# Patient Record
Sex: FEMALE | Race: WHITE | NOT HISPANIC OR LATINO
[De-identification: names, ages, dates, MRNs, and addresses within clinical notes are randomized per-mention and may not be internally consistent; named-entity substitution may affect disease eponyms.]

---

## 2018-07-08 ENCOUNTER — TRANSCRIPTION ENCOUNTER (OUTPATIENT)
Age: 38
End: 2018-07-08

## 2018-10-29 ENCOUNTER — TRANSCRIPTION ENCOUNTER (OUTPATIENT)
Age: 38
End: 2018-10-29

## 2022-01-05 ENCOUNTER — TRANSCRIPTION ENCOUNTER (OUTPATIENT)
Age: 42
End: 2022-01-05

## 2023-01-19 PROBLEM — Z00.00 ENCOUNTER FOR PREVENTIVE HEALTH EXAMINATION: Status: ACTIVE | Noted: 2023-01-19

## 2023-01-24 ENCOUNTER — NON-APPOINTMENT (OUTPATIENT)
Age: 43
End: 2023-01-24

## 2023-01-24 ENCOUNTER — APPOINTMENT (OUTPATIENT)
Dept: CARDIOLOGY | Facility: CLINIC | Age: 43
End: 2023-01-24
Payer: MEDICAID

## 2023-01-24 ENCOUNTER — TRANSCRIPTION ENCOUNTER (OUTPATIENT)
Age: 43
End: 2023-01-24

## 2023-01-24 VITALS — DIASTOLIC BLOOD PRESSURE: 84 MMHG | SYSTOLIC BLOOD PRESSURE: 132 MMHG

## 2023-01-24 VITALS
DIASTOLIC BLOOD PRESSURE: 82 MMHG | HEART RATE: 76 BPM | WEIGHT: 293 LBS | HEIGHT: 62 IN | SYSTOLIC BLOOD PRESSURE: 130 MMHG | BODY MASS INDEX: 53.92 KG/M2 | OXYGEN SATURATION: 97 %

## 2023-01-24 DIAGNOSIS — Z82.49 FAMILY HISTORY OF ISCHEMIC HEART DISEASE AND OTHER DISEASES OF THE CIRCULATORY SYSTEM: ICD-10-CM

## 2023-01-24 DIAGNOSIS — F32.A DEPRESSION, UNSPECIFIED: ICD-10-CM

## 2023-01-24 PROCEDURE — 99203 OFFICE O/P NEW LOW 30 MIN: CPT | Mod: 25

## 2023-01-24 PROCEDURE — 93000 ELECTROCARDIOGRAM COMPLETE: CPT

## 2023-01-24 RX ORDER — LORATADINE 5 MG/5 ML
SOLUTION, ORAL ORAL
Refills: 0 | Status: ACTIVE | COMMUNITY

## 2023-01-24 RX ORDER — ALBUTEROL SULFATE 90 UG/1
108 (90 BASE) INHALANT RESPIRATORY (INHALATION) AS DIRECTED
Refills: 0 | Status: ACTIVE | COMMUNITY

## 2023-01-24 RX ORDER — ALBUTEROL SULFATE 90 UG/1
108 (90 BASE) AEROSOL, METERED RESPIRATORY (INHALATION) AS DIRECTED
Refills: 0 | Status: ACTIVE | COMMUNITY

## 2023-01-24 RX ORDER — MONTELUKAST SODIUM 10 MG/1
10 TABLET, FILM COATED ORAL DAILY
Refills: 0 | Status: ACTIVE | COMMUNITY

## 2023-01-24 RX ORDER — CITALOPRAM HYDROBROMIDE 10 MG/1
10 TABLET, FILM COATED ORAL DAILY
Refills: 0 | Status: ACTIVE | COMMUNITY

## 2023-01-24 RX ORDER — PANTOPRAZOLE SODIUM 40 MG/1
40 TABLET, DELAYED RELEASE ORAL DAILY
Qty: 90 | Refills: 1 | Status: ACTIVE | COMMUNITY

## 2023-01-24 RX ORDER — LEVOTHYROXINE SODIUM 0.03 MG/1
25 TABLET ORAL DAILY
Refills: 0 | Status: ACTIVE | COMMUNITY

## 2023-01-24 NOTE — DISCUSSION/SUMMARY
[FreeTextEntry1] : Sherley is a 42-year-old female with medical history detailed above and active medical issues including:\par \par - Dyspnea, sedentary lifestyle, multiple CAD risk factors.  Coronary CTA and coronary calcium score ordered to access for obstructive CAD and risk stratification.  Echocardiogram ordered to evaluate for structural heart disease, carotid ultrasound to evaluate for PAD.\par \par - Near-syncope standing from sitting position, likely vasovagal.  Recommended increased oral hydration with electrolyte suppliment drinks, avoid caffeine and alcohol intake.\par \par - Morbid obesity.  Discussed calorie reduction and increased exercise as a weight reduction strategy.\par \par Advised patient to follow active lifestyle with regular cardiovascular exercise. Patient educated on heart healthy diet. Recommend increased oral hydration with electrolyte suppliment drinks, avoid excess alcohol and caffeine.  Patient is aware to call with any symptoms or concerns. \par \par Cardiology follow-up after noninvasive testing. \par \par Sherley will follow up with Dr Ml Guevara for primary care

## 2023-01-24 NOTE — HISTORY OF PRESENT ILLNESS
[FreeTextEntry1] : Sherley is a 42-year-old female with history of asthma, morbid obesity, anxiety depression after father  from CAD.\par \par No history of CAD, MI, revascularization, VHD, CHF, TIA, CVA, diabetes, PVD, DVT, PE, arrhythmia, AF.\par \par Patient has palpitations and dizziness standing from a sitting position and when standing for long periods.  Patient has dyspnea with moderate exertion.  Cardiovascular review of symptoms is negative for exertional chest pain or syncope.  No PND or orthopnea leg edema.  No bleeding or black stool.\par \par No exercise routine.  Patient is walking less than 5 minutes.  Patient is working at [employer] in [place].  Patient's father used CAD was a long-term patient of Dr Valentino. Patient remains emotional discussing her father. \par \par EKG 2023 sinus rhythm normal tracing\par \par

## 2023-02-09 ENCOUNTER — APPOINTMENT (OUTPATIENT)
Dept: CARDIOLOGY | Facility: CLINIC | Age: 43
End: 2023-02-09
Payer: MEDICAID

## 2023-02-09 PROCEDURE — 93244 EXT ECG>48HR<7D REV&INTERPJ: CPT

## 2023-02-16 ENCOUNTER — APPOINTMENT (OUTPATIENT)
Dept: CARDIOLOGY | Facility: CLINIC | Age: 43
End: 2023-02-16
Payer: MEDICAID

## 2023-02-16 VITALS
WEIGHT: 293 LBS | OXYGEN SATURATION: 96 % | BODY MASS INDEX: 53.92 KG/M2 | HEIGHT: 62 IN | SYSTOLIC BLOOD PRESSURE: 126 MMHG | HEART RATE: 76 BPM | DIASTOLIC BLOOD PRESSURE: 72 MMHG

## 2023-02-16 PROCEDURE — 99215 OFFICE O/P EST HI 40 MIN: CPT

## 2023-02-16 PROCEDURE — 93880 EXTRACRANIAL BILAT STUDY: CPT

## 2023-02-16 PROCEDURE — 93306 TTE W/DOPPLER COMPLETE: CPT

## 2023-02-16 NOTE — DISCUSSION/SUMMARY
[FreeTextEntry1] : Sherley is a 42-year-old female with medical history detailed above and active medical issues including:\par \par - Dyspnea, sedentary lifestyle, multiple CAD risk factors.  Coronary CTA and coronary calcium score ordered to access for obstructive CAD and risk stratification.  Normal LVEF normal Doppler echo Feb 2023\par \par - Near-syncope standing from sitting position, likely vasovagal.  Recommended increased oral hydration with electrolyte suppliment drinks, avoid caffeine and alcohol intake.  1 week Zio patch Feb 2023 sinus rhythm without significant arrhythmia\par \par - Morbid obesity.  Discussed calorie reduction and increased exercise as a weight reduction strategy.\par \par Advised patient to follow active lifestyle with regular cardiovascular exercise. Patient educated on heart healthy diet. Recommend increased oral hydration with electrolyte suppliment drinks, avoid excess alcohol and caffeine.  Patient is aware to call with any symptoms or concerns. \par \par Cardiology follow-up after telehealth after coronary CTA\par \par Sherley will follow up with Dr Ml Guevara for primary care\par

## 2023-02-16 NOTE — HISTORY OF PRESENT ILLNESS
[FreeTextEntry1] : Sherley is a 42-year-old female with history of asthma, morbid obesity, anxiety depression after father  from CAD.\par \par No history of CAD, MI, revascularization, VHD, CHF, TIA, CVA, diabetes, PVD, DVT, PE, arrhythmia, AF.\par \par Patient has palpitations and dizziness standing from a sitting position and when standing for long periods.  Patient has dyspnea with moderate exertion.  Cardiovascular review of symptoms is negative for exertional chest pain or syncope.  No PND or orthopnea leg edema.  No bleeding or black stool.\par \par No exercise routine.  Patient is walking less than 5 minutes.  Patient is working at [employer] in [place].  Patient's father with CAD / MI / ICD was a long-term patient of Dr Valentino. Patient remains emotional discussing her father. \par \par Echocardiogram 2023 LVEF 55 to 60%, normal Doppler, suboptimal study\par \par Zio patch 1 week heart monitor 2023 sinus rhythm average heart rate 86, rare PACs PVCs\par \par EKG 2023 sinus rhythm normal tracing\par \par

## 2023-03-07 ENCOUNTER — NON-APPOINTMENT (OUTPATIENT)
Age: 43
End: 2023-03-07

## 2023-03-22 PROBLEM — F41.9 ANXIETY: Status: ACTIVE | Noted: 2023-01-24

## 2023-03-22 NOTE — HISTORY OF PRESENT ILLNESS
[FreeTextEntry1] : Sherley is a 42-year-old female with history of asthma, morbid obesity, anxiety depression after father  from CAD.\par \par No history of CAD, MI, revascularization, VHD, CHF, TIA, CVA, diabetes, PVD, DVT, PE, arrhythmia, AF.\par \par Patient has palpitations and dizziness standing from a sitting position and when standing for long periods.  Patient has dyspnea with moderate exertion.  Cardiovascular review of symptoms is negative for exertional chest pain or syncope.  No PND or orthopnea leg edema.  No bleeding or black stool.\par \par No exercise routine.  Patient is walking less than 5 minutes.  Patient is working at [employer] in [place].  Patient's father with CAD / MI / ICD was a long-term patient of Dr Valentino. Patient remains emotional discussing her father. \par \par Echocardiogram 2023 LVEF 55 to 60%, normal Doppler, suboptimal study\par \par Zio patch 1 week heart monitor 2023 sinus rhythm average heart rate 86, rare PACs PVCs\par \par EKG 2023 sinus rhythm normal tracing\par \par

## 2023-03-22 NOTE — PHYSICAL EXAM
[Well Nourished] : well nourished [Well Developed] : well developed [No Acute Distress] : no acute distress [Normal Conjunctiva] : normal conjunctiva [Normal Venous Pressure] : normal venous pressure [No Carotid Bruit] : no carotid bruit [Normal S1, S2] : normal S1, S2 [No Murmur] : no murmur [No Rub] : no rub [No Gallop] : no gallop [Clear Lung Fields] : clear lung fields [Good Air Entry] : good air entry [No Respiratory Distress] : no respiratory distress  [Soft] : abdomen soft [Non Tender] : non-tender [No Masses/organomegaly] : no masses/organomegaly [Normal Bowel Sounds] : normal bowel sounds [Normal Gait] : normal gait [No Edema] : no edema [No Cyanosis] : no cyanosis [No Clubbing] : no clubbing [No Varicosities] : no varicosities [No Rash] : no rash [No Skin Lesions] : no skin lesions [Moves all extremities] : moves all extremities [No Focal Deficits] : no focal deficits [Alert and Oriented] : alert and oriented [Normal Speech] : normal speech [Normal memory] : normal memory

## 2023-03-24 ENCOUNTER — NON-APPOINTMENT (OUTPATIENT)
Age: 43
End: 2023-03-24

## 2023-03-29 ENCOUNTER — APPOINTMENT (OUTPATIENT)
Dept: CARDIOLOGY | Facility: CLINIC | Age: 43
End: 2023-03-29

## 2023-03-29 DIAGNOSIS — F41.9 ANXIETY DISORDER, UNSPECIFIED: ICD-10-CM

## 2023-04-18 ENCOUNTER — NON-APPOINTMENT (OUTPATIENT)
Age: 43
End: 2023-04-18

## 2023-04-27 ENCOUNTER — APPOINTMENT (OUTPATIENT)
Dept: CARDIOLOGY | Facility: CLINIC | Age: 43
End: 2023-04-27
Payer: MEDICAID

## 2023-04-27 PROCEDURE — 93015 CV STRESS TEST SUPVJ I&R: CPT

## 2023-05-08 ENCOUNTER — APPOINTMENT (OUTPATIENT)
Dept: CARDIOLOGY | Facility: CLINIC | Age: 43
End: 2023-05-08
Payer: MEDICAID

## 2023-05-08 PROCEDURE — 99203 OFFICE O/P NEW LOW 30 MIN: CPT | Mod: 95

## 2023-05-08 RX ORDER — METOPROLOL SUCCINATE 25 MG/1
25 TABLET, EXTENDED RELEASE ORAL
Qty: 5 | Refills: 0 | Status: DISCONTINUED | COMMUNITY
Start: 2023-03-23 | End: 2023-05-08

## 2023-05-08 NOTE — DISCUSSION/SUMMARY
[FreeTextEntry1] : Sherley is a 42-year-old female with medical history detailed above and active medical issues including:\par \par - Dyspnea, sedentary lifestyle, multiple CAD risk factors.  Normal exercise treadmill stress test April 2023.  Normal LVEF normal Doppler echo Feb 2023\par \par - Near-syncope standing from sitting position, likely vasovagal.  Recommended increased oral hydration with electrolyte suppliment drinks, avoid caffeine and alcohol intake.  1 week Zio patch Feb 2023 sinus rhythm without significant arrhythmia\par \par - Morbid obesity.  Discussed calorie reduction and increased exercise as a weight reduction strategy.\par \par Advised patient to follow active lifestyle with regular cardiovascular exercise. Patient educated on heart healthy diet. Recommend increased oral hydration with electrolyte suppliment drinks, avoid excess alcohol and caffeine.  Patient is aware to call with any symptoms or concerns. \par \par Cardiology follow-up 6 months.  Patient currently not on cardiac medication.  Labs ordered\par \par Sherley will follow up with Dr Ml Guevara for primary care\par

## 2023-05-08 NOTE — HISTORY OF PRESENT ILLNESS
[FreeTextEntry1] : Sherley is a 42-year-old female with history of asthma, morbid obesity, anxiety depression after father  from CAD.\par \par No history of CAD, MI, revascularization, VHD, CHF, TIA, CVA, diabetes, PVD, DVT, PE, arrhythmia, AF.\par \par Patient has palpitations and dizziness standing from a sitting position and when standing for long periods.  Patient has dyspnea with moderate exertion.  Cardiovascular review of symptoms is negative for exertional chest pain or syncope.  No PND or orthopnea leg edema.  No bleeding or black stool.\par \par No exercise routine.  Patient is walking 15 minutes on occasion.  Patient is working at [employer] in [place].  Patient's father with CAD / MI / ICD was a long-term patient of Dr Valentino. Patient remains emotional discussing her father's passing. \par \par Exercise treadmill stress test 2023 nonischemic EKG response, no chest pain, 91% MPHR, 3 minutes 43 seconds Gerald protocol.  Baseline sinus rhythm.\par \par Echocardiogram 2023 LVEF 55 to 60%, normal Doppler, suboptimal study\par \par Zio patch 1 week heart monitor 2023 sinus rhythm average heart rate 86, rare PACs PVCs\par \par EKG 2023 sinus rhythm normal tracing\par \par

## 2023-06-06 ENCOUNTER — NON-APPOINTMENT (OUTPATIENT)
Age: 43
End: 2023-06-06

## 2023-10-16 ENCOUNTER — APPOINTMENT (OUTPATIENT)
Dept: CARDIOLOGY | Facility: CLINIC | Age: 43
End: 2023-10-16

## 2023-11-14 ENCOUNTER — APPOINTMENT (OUTPATIENT)
Dept: CARDIOLOGY | Facility: CLINIC | Age: 43
End: 2023-11-14
Payer: MEDICAID

## 2023-11-14 VITALS
DIASTOLIC BLOOD PRESSURE: 78 MMHG | BODY MASS INDEX: 53.92 KG/M2 | HEART RATE: 86 BPM | SYSTOLIC BLOOD PRESSURE: 146 MMHG | WEIGHT: 293 LBS | HEIGHT: 62 IN | OXYGEN SATURATION: 98 %

## 2023-11-14 DIAGNOSIS — Z87.898 PERSONAL HISTORY OF OTHER SPECIFIED CONDITIONS: ICD-10-CM

## 2023-11-14 PROCEDURE — 99214 OFFICE O/P EST MOD 30 MIN: CPT

## 2023-11-14 PROCEDURE — 93242 EXT ECG>48HR<7D RECORDING: CPT

## 2023-11-14 RX ORDER — PREDNISONE 50 MG/1
50 TABLET ORAL
Qty: 3 | Refills: 0 | Status: ACTIVE | COMMUNITY
Start: 1900-01-01 | End: 1900-01-01

## 2023-11-14 RX ORDER — DIPHENHYDRAMINE HCL 50 MG/1
50 CAPSULE ORAL
Qty: 1 | Refills: 0 | Status: ACTIVE | COMMUNITY
Start: 1900-01-01 | End: 1900-01-01

## 2023-11-29 PROBLEM — Z87.898 HISTORY OF PALPITATIONS: Status: RESOLVED | Noted: 2023-05-08 | Resolved: 2023-11-29

## 2023-11-29 PROCEDURE — 93244 EXT ECG>48HR<7D REV&INTERPJ: CPT

## 2024-01-03 PROBLEM — J45.909 ASTHMA: Status: ACTIVE | Noted: 2023-01-24

## 2024-01-03 NOTE — DISCUSSION/SUMMARY
[FreeTextEntry1] : Sherley is a 43-year-old female with medical history detailed above and active medical issues including:  - Hospital follow-up [facility] Nov 2023 chest pain, palpitations, discharged home on no cardiac medications.  Cardiac CTA   to evaluate for obstructive CAD as the etiology for chest pain.  Zio patch 1 week heart monitor started today.  Recommended increased oral hydration with electrolyte suppliment drinks, avoid caffeine and alcohol.  - Dyspnea, sedentary lifestyle, multiple CAD risk factors.  Normal exercise treadmill stress test April 2023.  Normal LVEF normal Doppler echo Feb 2023  - Near-syncope standing from sitting position, likely vasovagal.  Recommended increased oral hydration with electrolyte suppliment drinks, avoid caffeine and alcohol intake.  1 week Zio patch Feb 2023 sinus rhythm without significant arrhythmia  - Morbid obesity.  Discussed calorie reduction and increased exercise as a weight reduction strategy.  Advised patient to follow active lifestyle with regular cardiovascular exercise. Patient educated on heart healthy diet. Recommend increased oral hydration with electrolyte suppliment drinks, avoid excess alcohol and caffeine.  Patient is aware to call with any symptoms or concerns.   Cardiology follow-up after noninvasive testing.  Patient currently not on cardiac medication.  Labs ordered  Sherley will follow up with Dr Ml Guevara for primary care  Total time spent 45 minutes, reviewing of test results, chart information, patient discussion, physical exam and completion of chart documentation.

## 2024-01-03 NOTE — HISTORY OF PRESENT ILLNESS
[FreeTextEntry1] : Sherley is a 43-year-old female with history of asthma, morbid obesity, anxiety depression after father  from CAD.  No history of CAD, MI, revascularization, VHD, CHF, TIA, CVA, diabetes, PVD, DVT, PE, arrhythmia, AF.  Patient has palpitations and dizziness standing from a sitting position and when standing for long periods.  Patient has dyspnea with moderate exertion.  Cardiovascular review of symptoms is negative for exertional chest pain or syncope.  No PND or orthopnea leg edema.  No bleeding or black stool.  No exercise routine.  Patient is walking 15 minutes on occasion.  Patient is working at [employer] in [place].  Patient's father with CAD / MI / ICD was a long-term patient of Dr Valentino. Patient remains emotional discussing her father's passing.   Exercise treadmill stress test 2023 nonischemic EKG response, no chest pain, 91% MPHR, 3 minutes 43 seconds Gerald protocol.  Baseline sinus rhythm.  Echocardiogram 2023 LVEF 55 to 60%, normal Doppler, suboptimal study  Zio patch 1 week heart monitor 2023 sinus rhythm average heart rate 86, rare PACs PVCs  EKG 2023 sinus rhythm normal tracing

## 2024-01-10 ENCOUNTER — APPOINTMENT (OUTPATIENT)
Dept: CARDIOLOGY | Facility: CLINIC | Age: 44
End: 2024-01-10

## 2024-01-10 DIAGNOSIS — J45.909 UNSPECIFIED ASTHMA, UNCOMPLICATED: ICD-10-CM

## 2024-01-11 ENCOUNTER — APPOINTMENT (OUTPATIENT)
Dept: CARDIOLOGY | Facility: CLINIC | Age: 44
End: 2024-01-11
Payer: MEDICAID

## 2024-01-11 DIAGNOSIS — R00.2 PALPITATIONS: ICD-10-CM

## 2024-01-11 DIAGNOSIS — R07.9 CHEST PAIN, UNSPECIFIED: ICD-10-CM

## 2024-01-11 DIAGNOSIS — R42 DIZZINESS AND GIDDINESS: ICD-10-CM

## 2024-01-11 DIAGNOSIS — K21.9 GASTRO-ESOPHAGEAL REFLUX DISEASE W/OUT ESOPHAGITIS: ICD-10-CM

## 2024-01-11 DIAGNOSIS — E66.9 OBESITY, UNSPECIFIED: ICD-10-CM

## 2024-01-11 DIAGNOSIS — R06.09 OTHER FORMS OF DYSPNEA: ICD-10-CM

## 2024-01-11 PROCEDURE — 99203 OFFICE O/P NEW LOW 30 MIN: CPT | Mod: 95

## 2024-09-12 ENCOUNTER — NON-APPOINTMENT (OUTPATIENT)
Age: 44
End: 2024-09-12

## 2025-08-09 ENCOUNTER — NON-APPOINTMENT (OUTPATIENT)
Age: 45
End: 2025-08-09

## 2025-08-11 ENCOUNTER — APPOINTMENT (OUTPATIENT)
Dept: CARDIOLOGY | Facility: CLINIC | Age: 45
End: 2025-08-11
Payer: COMMERCIAL

## 2025-08-11 VITALS
HEART RATE: 84 BPM | SYSTOLIC BLOOD PRESSURE: 136 MMHG | BODY MASS INDEX: 50.61 KG/M2 | OXYGEN SATURATION: 96 % | HEIGHT: 62 IN | WEIGHT: 275 LBS | DIASTOLIC BLOOD PRESSURE: 74 MMHG

## 2025-08-11 DIAGNOSIS — R00.2 PALPITATIONS: ICD-10-CM

## 2025-08-11 DIAGNOSIS — J45.909 UNSPECIFIED ASTHMA, UNCOMPLICATED: ICD-10-CM

## 2025-08-11 DIAGNOSIS — R06.09 OTHER FORMS OF DYSPNEA: ICD-10-CM

## 2025-08-11 DIAGNOSIS — R07.9 CHEST PAIN, UNSPECIFIED: ICD-10-CM

## 2025-08-11 DIAGNOSIS — K21.9 GASTRO-ESOPHAGEAL REFLUX DISEASE W/OUT ESOPHAGITIS: ICD-10-CM

## 2025-08-11 DIAGNOSIS — R42 DIZZINESS AND GIDDINESS: ICD-10-CM

## 2025-08-11 DIAGNOSIS — F41.9 ANXIETY DISORDER, UNSPECIFIED: ICD-10-CM

## 2025-08-11 PROCEDURE — 99215 OFFICE O/P EST HI 40 MIN: CPT

## 2025-08-11 RX ORDER — MECLIZINE HYDROCHLORIDE 25 MG/1
25 TABLET ORAL
Refills: 0 | Status: ACTIVE | COMMUNITY

## 2025-09-04 ENCOUNTER — NON-APPOINTMENT (OUTPATIENT)
Age: 45
End: 2025-09-04

## 2025-09-09 ENCOUNTER — APPOINTMENT (OUTPATIENT)
Dept: CARDIOLOGY | Facility: CLINIC | Age: 45
End: 2025-09-09
Payer: COMMERCIAL

## 2025-09-09 VITALS
WEIGHT: 268 LBS | HEIGHT: 62 IN | BODY MASS INDEX: 49.32 KG/M2 | OXYGEN SATURATION: 98 % | DIASTOLIC BLOOD PRESSURE: 76 MMHG | SYSTOLIC BLOOD PRESSURE: 124 MMHG | HEART RATE: 83 BPM

## 2025-09-09 DIAGNOSIS — R07.9 CHEST PAIN, UNSPECIFIED: ICD-10-CM

## 2025-09-09 DIAGNOSIS — K21.9 GASTRO-ESOPHAGEAL REFLUX DISEASE W/OUT ESOPHAGITIS: ICD-10-CM

## 2025-09-09 DIAGNOSIS — R06.09 OTHER FORMS OF DYSPNEA: ICD-10-CM

## 2025-09-09 DIAGNOSIS — R42 DIZZINESS AND GIDDINESS: ICD-10-CM

## 2025-09-09 DIAGNOSIS — R00.2 PALPITATIONS: ICD-10-CM

## 2025-09-09 DIAGNOSIS — J45.909 UNSPECIFIED ASTHMA, UNCOMPLICATED: ICD-10-CM

## 2025-09-09 PROCEDURE — 99215 OFFICE O/P EST HI 40 MIN: CPT
